# Patient Record
Sex: FEMALE | Race: ASIAN | NOT HISPANIC OR LATINO | URBAN - METROPOLITAN AREA
[De-identification: names, ages, dates, MRNs, and addresses within clinical notes are randomized per-mention and may not be internally consistent; named-entity substitution may affect disease eponyms.]

---
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
## 2021-05-29 NOTE — PHYSICAL EXAM
oral [WNL for age] : within normal limits of age [Enlarged Diffusely] : was diffusely enlarged [Soft] : was soft [None] : there were no thyroid nodules [5] : was Steven stage 5 [Normal for Age] : was normal for age [Healthy Appearing] : healthy appearing [Well Nourished] : well nourished [Interactive] : interactive [Normal Appearance] : normal appearance [Well formed] : well formed [Normally Set] : normally set [Normal S1 and S2] : normal S1 and S2 [Abdomen Soft] : soft [Clear to Ausculation Bilaterally] : clear to auscultation bilaterally [Abdomen Tenderness] : non-tender [] : no hepatosplenomegaly [Normal] : normal  [Murmur] : no murmurs

## 2021-08-30 NOTE — ED ADULT TRIAGE NOTE - RESPIRATORY RATE (BREATHS/MIN)
Problem: Pain - Adult  Goal: Verbalizes/displays adequate comfort level or baseline comfort level  Description: INTERVENTIONS:  1. Encourage patient to monitor pain and request interventions  2. Assess pain using the appropriate pain scale  3. Administer analgesics based on type and severity of pain and evaluate response  4. Educate/Implement non-pharmacological measures as appropriate and evaluate response  5. Consider cultural, developmental and social influences on pain and pain management  6. Notify Provider if interventions unsuccessful or patient reports new pain  Outcome: Progressing  Flowsheets (Taken 8/30/2021 0851)  Verbalizes/displays adequate comfort level or baseline comfort level:   Encourage patient to monitor pain and request interventions   Assess pain using the appropriate pain scale     Problem: Infection - Adult  Goal: Absence of infection during hospitalization  Description: INTERVENTIONS:  1. Assess and monitor for signs and symptoms of infection  2. Monitor lab/diagnostic results  3. Monitor all insertion sites/wounds/incisions  4. Monitor secretions for changes in amount and color  5. Administer medications as ordered  6. Educate and encourage patient and family to use good hand hygiene technique  7. Identify and educate in appropriate isolation precautions for identified infection/condition  Outcome: Progressing  Flowsheets (Taken 8/30/2021 0851)  Absence of infection during hospitalization:   Assess and monitor for signs and symptoms of infection   Monitor lab/diagnostic results   Monitor all insertion sites/wounds/incisions   Monitor secretions for changes in amount and colo     Problem: Safety Adult - Fall  Goal: Free from fall injury  Description: INTERVENTIONS:    Inpatient - Please reference Cares/Safety Flowsheet under Fair Fall Risk for interventions.  Pediatrics - Please reference Peds Daily Cares/Safety Flowsheet under [facility] Pediatric Fall Assessment Fall Bundle for  interventions  LD/OB - Please reference OB Shift Screening Flowsheet under OB Fall Risk for interventions.  Outcome: Progressing  Note: Bed in low position, wheels locked, call bell within reach, significant other at bedside     Problem: Antepartum  Goal: Maintain pregnancy as long as maternal and/or fetal condition is stable  Description: INTERVENTIONS:  1. Maternal surveillance  2. Fetal surveillance  3. Monitor uterine activity  4. Medications as ordered  5. Bedrest  Outcome: Progressing  Flowsheets (Taken 8/30/2021 6893)  Maintain pregnancy as long as maternal and/or fetal condition is stable:   Maternal surveillance   Fetal surveillance   Monitor uterine activity   Medications as ordered   Bedrest      18

## 2022-07-14 NOTE — ED PROVIDER NOTE - NSCAREINITIATED _GEN_ER
Detail Level: Generalized Detail Level: Zone Detail Level: Detailed Detail Level: Simple Tony Jackson(Attending)

## 2022-10-30 NOTE — ED PROVIDER NOTE - CONDITION AT DISCHARGE:
Improved This was a shared visit with the KELSEY. I reviewed and verified the documentation and independently performed the documented:

## 2023-02-13 NOTE — REVIEW OF SYSTEMS
PLEASE READ YOUR DISCHARGE INSTRUCTIONS ENTIRELY AS IT CONTAINS IMPORTANT INFORMATION.    Use the antibiotic ointment for 5 days then stop and allow to scab over    Do not wet laceration for 12 hours.  Do not submerge laceration in water.  Gently clean wound with soap and water at least twice daily unless more frequently soiled then clean more frequently.  May apply Bactroban to wound to promote healing.  Must clean old antibiotic ointment away before new application.  DO NOT REMOVE SUTURES YOURSELF.  Return to clinic for suture removal as directed.   Signs of infection:  Increase in redness, increase in pain, purulent (pus) drainage. Contact clinic if infection concerns arise. Do not apply a great deal of tension or stress to the suture line.    Return in 7-10 days to have the sutures removed.     Please return or see your primary care doctor if you develop new or worsening symptoms (fever, spreading redness, pus drainage, severe pain or swelling).     Please arrange follow up with your primary medical clinic as soon as possible. You must understand that you've received an Urgent Care treatment only and that you may be released before all of your medical problems are known or treated. You, the patient, will arrange for follow up as instructed. If your symptoms worsen or fail to improve you should go to the Emergency Room.    [Negative] : Skin

## 2023-12-31 PROBLEM — Z11.3 ENCOUNTER FOR SCREENING EXAMINATION FOR SEXUALLY TRANSMITTED DISEASE: Status: RESOLVED | Noted: 2018-05-22 | Resolved: 2020-12-21

## 2024-05-16 NOTE — HISTORY OF PRESENT ILLNESS
Spoke with patient and scheduled 6 month follow up for 11/13 1030AM virtual.   [FreeTextEntry6] : 18 y/o female presenting for ringworm.  Reports spreading of rash despite twice daily use of econazole cream prescribed at last visit 1 month ago.  Reports her tube of cream is now done.  Reports rash is pruritic.  Also c/o peeling and malodorous skin between the webs of her toes on her R foot.  Also has hx of eczema.  \par \par No  complaints currently.  Informed of negative STI results from last visit.  Informed that BD Affirm was not run, however pt denies any current  complaints.\par \par Still on father's private insurance and will be using it for the next year.  Needs to make endocrinology appt for f/u of hypothyroidism.\par \par On Nexplanon for BC.  Reports occasional light spotting intermittently.  No full periods.  Last SA was yesterday.  Using condoms always. [de-identified] : ringworm

## 2024-05-24 NOTE — ED ADULT NURSE NOTE - DOES PATIENT HAVE ADVANCE DIRECTIVE
No Assumed care of pt at 1859. Pt A&Ox4 c/o CP while at the air show with her partner, pt on cardiac monitor in sinus tach, family at bedside, pt has halter monitor in place, pt denies N/V/D, pt resting comfortably showing no signs of respiratory distress or pain, the pt is calm and cooperative, history of developmental delay